# Patient Record
Sex: FEMALE | HISPANIC OR LATINO | URBAN - METROPOLITAN AREA
[De-identification: names, ages, dates, MRNs, and addresses within clinical notes are randomized per-mention and may not be internally consistent; named-entity substitution may affect disease eponyms.]

---

## 2024-11-05 ENCOUNTER — APPOINTMENT (RX ONLY)
Dept: URBAN - METROPOLITAN AREA CLINIC 94 | Facility: CLINIC | Age: 44
Setting detail: DERMATOLOGY
End: 2024-11-05

## 2024-11-05 DIAGNOSIS — L21.8 OTHER SEBORRHEIC DERMATITIS: ICD-10-CM

## 2024-11-05 DIAGNOSIS — L73.2 HIDRADENITIS SUPPURATIVA: ICD-10-CM

## 2024-11-05 DIAGNOSIS — L65.9 NONSCARRING HAIR LOSS, UNSPECIFIED: ICD-10-CM

## 2024-11-05 PROCEDURE — ? ORDER TESTS

## 2024-11-05 PROCEDURE — ? COUNSELING

## 2024-11-05 PROCEDURE — 99204 OFFICE O/P NEW MOD 45 MIN: CPT

## 2024-11-05 PROCEDURE — ? PRESCRIPTION MEDICATION MANAGEMENT

## 2024-11-05 PROCEDURE — ? PRESCRIPTION

## 2024-11-05 RX ORDER — CLINDAMYCIN PHOSPHATE 10 MG/ML
SOLUTION TOPICAL BID
Qty: 60 | Refills: 4 | Status: ERX | COMMUNITY
Start: 2024-11-05

## 2024-11-05 RX ORDER — DOXYCYCLINE HYCLATE 100 MG/1
CAPSULE, GELATIN COATED ORAL
Qty: 28 | Refills: 0 | Status: ERX | COMMUNITY
Start: 2024-11-05

## 2024-11-05 RX ORDER — KETOCONAZOLE 20 MG/ML
SHAMPOO, SUSPENSION TOPICAL
Qty: 120 | Refills: 6 | Status: ERX | COMMUNITY
Start: 2024-11-05

## 2024-11-05 RX ORDER — FLUOCINONIDE 0.5 MG/ML
SOLUTION TOPICAL
Qty: 60 | Refills: 3 | Status: ERX | COMMUNITY
Start: 2024-11-05

## 2024-11-05 RX ADMIN — FLUOCINONIDE: 0.5 SOLUTION TOPICAL at 00:00

## 2024-11-05 RX ADMIN — CLINDAMYCIN PHOSPHATE: 10 SOLUTION TOPICAL at 00:00

## 2024-11-05 RX ADMIN — DOXYCYCLINE HYCLATE: 100 CAPSULE, GELATIN COATED ORAL at 00:00

## 2024-11-05 RX ADMIN — KETOCONAZOLE: 20 SHAMPOO, SUSPENSION TOPICAL at 00:00

## 2024-11-05 ASSESSMENT — LOCATION ZONE DERM
LOCATION ZONE: FACE
LOCATION ZONE: SCALP
LOCATION ZONE: SCALP
LOCATION ZONE: LEG

## 2024-11-05 ASSESSMENT — LOCATION SIMPLE DESCRIPTION DERM
LOCATION SIMPLE: LEFT THIGH
LOCATION SIMPLE: SUPERIOR FOREHEAD
LOCATION SIMPLE: RIGHT THIGH
LOCATION SIMPLE: FRONTAL SCALP
LOCATION SIMPLE: FRONTAL SCALP

## 2024-11-05 ASSESSMENT — LOCATION DETAILED DESCRIPTION DERM
LOCATION DETAILED: SUPERIOR MID FOREHEAD
LOCATION DETAILED: MEDIAL FRONTAL SCALP
LOCATION DETAILED: MEDIAL FRONTAL SCALP
LOCATION DETAILED: RIGHT ANTERIOR PROXIMAL THIGH
LOCATION DETAILED: LEFT ANTERIOR PROXIMAL THIGH

## 2024-11-05 NOTE — PROCEDURE: PRESCRIPTION MEDICATION MANAGEMENT
Initiate Treatment: ketoconazole 2 % shampoo BIW to TIW\\nQuantity: 120.0 ml  Days Supply: 30\\nSig: Lather into scalp 2-3 times per week, then let sit for approximately 20-30 minutes prior to rinsing out.
Render In Strict Bullet Format?: No
Detail Level: Zone
Plan: Biologics on reserve, pending blood work
Initiate Treatment: clindamycin phosphate 1 % topical solution BID\\nQuantity: 60.0 ml  Days Supply: 30\\nSig: Apply to affected areas  BID, prn for flares\\n\\ndoxycycline hyclate 100 mg capsule \\nQuantity: 28.0 Capsule  Days Supply: 30\\nSig: Take x1 pill once a day with food and water for 2 weeks
Initiate Treatment: fluocinonide 0.05 % topical solution \\nQuantity: 60.0 ml  Days Supply: 30\\nSig: Apply to affected areas on the scalp twice a day x2 weeks,1 week off, prn flares

## 2025-02-04 ENCOUNTER — RX ONLY (RX ONLY)
Age: 45
End: 2025-02-04

## 2025-02-04 ENCOUNTER — APPOINTMENT (OUTPATIENT)
Dept: URBAN - METROPOLITAN AREA CLINIC 94 | Facility: CLINIC | Age: 45
Setting detail: DERMATOLOGY
End: 2025-02-04

## 2025-02-04 DIAGNOSIS — L65.9 NONSCARRING HAIR LOSS, UNSPECIFIED: ICD-10-CM

## 2025-02-04 PROCEDURE — ? PRESCRIPTION MEDICATION MANAGEMENT

## 2025-02-04 PROCEDURE — ? INTRALESIONAL KENALOG

## 2025-02-04 PROCEDURE — ? ADDITIONAL NOTES

## 2025-02-04 PROCEDURE — 11900 INJECT SKIN LESIONS </W 7: CPT

## 2025-02-04 PROCEDURE — ? COUNSELING

## 2025-02-04 RX ORDER — FLUOCINONIDE 0.5 MG/ML
SOLUTION TOPICAL
Qty: 60 | Refills: 3 | Status: ERX

## 2025-02-04 ASSESSMENT — LOCATION DETAILED DESCRIPTION DERM
LOCATION DETAILED: LEFT SUPERIOR MEDIAL FOREHEAD
LOCATION DETAILED: LEFT MEDIAL FOREHEAD
LOCATION DETAILED: SUPERIOR MID FOREHEAD
LOCATION DETAILED: RIGHT FOREHEAD
LOCATION DETAILED: INFERIOR MID FOREHEAD
LOCATION DETAILED: LEFT FOREHEAD

## 2025-02-04 ASSESSMENT — LOCATION SIMPLE DESCRIPTION DERM
LOCATION SIMPLE: INFERIOR FOREHEAD
LOCATION SIMPLE: RIGHT FOREHEAD
LOCATION SIMPLE: LEFT FOREHEAD
LOCATION SIMPLE: SUPERIOR FOREHEAD

## 2025-02-04 ASSESSMENT — LOCATION ZONE DERM: LOCATION ZONE: FACE

## 2025-02-04 NOTE — PROCEDURE: INTRALESIONAL KENALOG
Bill For Wasted Drug (Kenalog)?: no
How Many Mls Were Removed From The 40 Mg/Ml (10ml) Vial When Preparing The Injectable Solution?: 0
Detail Level: Simple
Validate Note Data When Using Inventory: Yes
Total Volume (Ccs): 1
Kenalog Preparation: Kenalog with 1% lidocaine without epinephrine
Medical Necessity Clause: This procedure was medically necessary because the lesions that were treated were:
Consent: The risks of atrophy were reviewed with the patient.
Kenalog Type Of Vial: Multiple Dose
Administered By (Optional): Stefania Cabrera PA-C
Lot # For Kenalog (Optional): [id number]
Ndc# For Kenalog Only: [id number]
Expiration Date For Kenalog (Optional): 04/2025
Concentration Of Kenalog Solution Injected (Mg/Ml): 2.5
Show Inventory Tab: Hide

## 2025-02-04 NOTE — PROCEDURE: ADDITIONAL NOTES
Additional Notes: Pending approval from PCP to start minoxidil
Detail Level: Simple
Render Risk Assessment In Note?: no

## 2025-02-04 NOTE — PROCEDURE: PRESCRIPTION MEDICATION MANAGEMENT
Render In Strict Bullet Format?: No
Detail Level: Zone
Continue Regimen: fluocinonide 0.05 % topical solution \\nSig: Apply to affected areas on the scalp twice a day x2 weeks,1 week off, prn flares\\n\\nRefills sent today.

## 2025-04-08 ENCOUNTER — APPOINTMENT (OUTPATIENT)
Dept: URBAN - METROPOLITAN AREA CLINIC 94 | Facility: CLINIC | Age: 45
Setting detail: DERMATOLOGY
End: 2025-04-08

## 2025-04-08 DIAGNOSIS — L65.9 NONSCARRING HAIR LOSS, UNSPECIFIED: ICD-10-CM

## 2025-04-08 DIAGNOSIS — L73.2 HIDRADENITIS SUPPURATIVA: ICD-10-CM

## 2025-04-08 PROCEDURE — ? PRESCRIPTION

## 2025-04-08 PROCEDURE — ? ADDITIONAL NOTES

## 2025-04-08 PROCEDURE — ? COUNSELING

## 2025-04-08 PROCEDURE — ? ORDER TESTS

## 2025-04-08 PROCEDURE — ? PRESCRIPTION MEDICATION MANAGEMENT

## 2025-04-08 PROCEDURE — 99214 OFFICE O/P EST MOD 30 MIN: CPT

## 2025-04-08 RX ORDER — BIMEKIZUMAB 320 MG/2ML
INJECTION, SOLUTION SUBCUTANEOUS
Qty: 5 | Refills: 0 | Status: ERX | COMMUNITY
Start: 2025-04-08

## 2025-04-08 RX ORDER — DOXYCYCLINE HYCLATE 100 MG/1
CAPSULE, GELATIN COATED ORAL
Qty: 7 | Refills: 2 | Status: ERX

## 2025-04-08 RX ORDER — BIMEKIZUMAB 320 MG/2ML
INJECTION, SOLUTION SUBCUTANEOUS
Qty: 2 | Refills: 6 | Status: ERX

## 2025-04-08 RX ADMIN — BIMEKIZUMAB: 320 INJECTION, SOLUTION SUBCUTANEOUS at 00:00

## 2025-04-08 ASSESSMENT — LOCATION DETAILED DESCRIPTION DERM: LOCATION DETAILED: SUPERIOR MID FOREHEAD

## 2025-04-08 ASSESSMENT — LOCATION SIMPLE DESCRIPTION DERM: LOCATION SIMPLE: SUPERIOR FOREHEAD

## 2025-04-08 ASSESSMENT — LOCATION ZONE DERM: LOCATION ZONE: FACE

## 2025-04-08 NOTE — PROCEDURE: ORDER TESTS
Bill For Surgical Tray: no
Performing Laboratory: [id number]
Expected Date Of Service: 04/08/2025
Billing Type: Third-Party Bill

## 2025-05-14 ENCOUNTER — RX ONLY (RX ONLY)
Age: 45
End: 2025-05-14

## 2025-05-14 RX ORDER — MINOXIDIL 2.5 MG/1
TABLET ORAL
Qty: 30 | Refills: 3 | Status: ERX | COMMUNITY
Start: 2025-05-14